# Patient Record
Sex: MALE | Race: WHITE | Employment: FULL TIME | URBAN - METROPOLITAN AREA
[De-identification: names, ages, dates, MRNs, and addresses within clinical notes are randomized per-mention and may not be internally consistent; named-entity substitution may affect disease eponyms.]

---

## 2018-08-16 ENCOUNTER — TRANSFERRED RECORDS (OUTPATIENT)
Dept: HEALTH INFORMATION MANAGEMENT | Facility: CLINIC | Age: 58
End: 2018-08-16

## 2018-10-01 ENCOUNTER — APPOINTMENT (OUTPATIENT)
Dept: GENERAL RADIOLOGY | Facility: CLINIC | Age: 58
End: 2018-10-01
Attending: ORTHOPAEDIC SURGERY
Payer: COMMERCIAL

## 2018-10-01 ENCOUNTER — ANESTHESIA EVENT (OUTPATIENT)
Dept: SURGERY | Facility: CLINIC | Age: 58
End: 2018-10-01
Payer: COMMERCIAL

## 2018-10-01 ENCOUNTER — ANESTHESIA (OUTPATIENT)
Dept: SURGERY | Facility: CLINIC | Age: 58
End: 2018-10-01
Payer: COMMERCIAL

## 2018-10-01 ENCOUNTER — HOSPITAL ENCOUNTER (INPATIENT)
Facility: CLINIC | Age: 58
LOS: 2 days | Discharge: HOME | End: 2018-10-03
Attending: ORTHOPAEDIC SURGERY | Admitting: ORTHOPAEDIC SURGERY
Payer: COMMERCIAL

## 2018-10-01 DIAGNOSIS — M48.062 SPINAL STENOSIS, LUMBAR REGION, WITH NEUROGENIC CLAUDICATION: Primary | ICD-10-CM

## 2018-10-01 LAB
ABO + RH BLD: NORMAL
ABO + RH BLD: NORMAL
BLD GP AB SCN SERPL QL: NORMAL
BLOOD BANK CMNT PATIENT-IMP: NORMAL
POTASSIUM SERPL-SCNC: 4.5 MMOL/L (ref 3.4–5.3)
SPECIMEN EXP DATE BLD: NORMAL

## 2018-10-01 PROCEDURE — 27210995 ZZH RX 272: Performed by: ORTHOPAEDIC SURGERY

## 2018-10-01 PROCEDURE — 25000132 ZZH RX MED GY IP 250 OP 250 PS 637: Performed by: ORTHOPAEDIC SURGERY

## 2018-10-01 PROCEDURE — 36000071 ZZH SURGERY LEVEL 5 W FLUORO 1ST 30 MIN: Performed by: ORTHOPAEDIC SURGERY

## 2018-10-01 PROCEDURE — 25000128 H RX IP 250 OP 636: Performed by: ANESTHESIOLOGY

## 2018-10-01 PROCEDURE — 25000131 ZZH RX MED GY IP 250 OP 636 PS 637: Performed by: ANESTHESIOLOGY

## 2018-10-01 PROCEDURE — 40000985 XR LUMBAR SPINE PORT 1 VW

## 2018-10-01 PROCEDURE — 86900 BLOOD TYPING SEROLOGIC ABO: CPT | Performed by: ORTHOPAEDIC SURGERY

## 2018-10-01 PROCEDURE — 36000069 ZZH SURGERY LEVEL 5 EA 15 ADDTL MIN: Performed by: ORTHOPAEDIC SURGERY

## 2018-10-01 PROCEDURE — 86850 RBC ANTIBODY SCREEN: CPT | Performed by: ORTHOPAEDIC SURGERY

## 2018-10-01 PROCEDURE — 84132 ASSAY OF SERUM POTASSIUM: CPT | Performed by: ANESTHESIOLOGY

## 2018-10-01 PROCEDURE — 27210794 ZZH OR GENERAL SUPPLY STERILE: Performed by: ORTHOPAEDIC SURGERY

## 2018-10-01 PROCEDURE — 40000169 ZZH STATISTIC PRE-PROCEDURE ASSESSMENT I: Performed by: ORTHOPAEDIC SURGERY

## 2018-10-01 PROCEDURE — 27211219 ZZ H OR RESEVOIR 3L 150 MICRON FILTER CELLSAVER HARDSHELL 00205-00: Performed by: ORTHOPAEDIC SURGERY

## 2018-10-01 PROCEDURE — 27211224 ZZ H OR BOWL PROCESSING SET 225ML FOR ELITE MACHINE CSE-P-225: Performed by: ORTHOPAEDIC SURGERY

## 2018-10-01 PROCEDURE — 37000008 ZZH ANESTHESIA TECHNICAL FEE, 1ST 30 MIN: Performed by: ORTHOPAEDIC SURGERY

## 2018-10-01 PROCEDURE — 25000128 H RX IP 250 OP 636: Performed by: ORTHOPAEDIC SURGERY

## 2018-10-01 PROCEDURE — 27211220 ZZ H OR ASSEMBLY BASIC A&A LINE 00208-00: Performed by: ORTHOPAEDIC SURGERY

## 2018-10-01 PROCEDURE — 27211215 ZZ H OR FILTER BLOOD TRANS 40 MICRON SQ40S: Performed by: ORTHOPAEDIC SURGERY

## 2018-10-01 PROCEDURE — 25000132 ZZH RX MED GY IP 250 OP 250 PS 637: Performed by: ANESTHESIOLOGY

## 2018-10-01 PROCEDURE — 25000128 H RX IP 250 OP 636: Performed by: NURSE ANESTHETIST, CERTIFIED REGISTERED

## 2018-10-01 PROCEDURE — 25000125 ZZHC RX 250: Performed by: NURSE ANESTHETIST, CERTIFIED REGISTERED

## 2018-10-01 PROCEDURE — 36415 COLL VENOUS BLD VENIPUNCTURE: CPT | Performed by: ANESTHESIOLOGY

## 2018-10-01 PROCEDURE — 25000125 ZZHC RX 250: Performed by: ORTHOPAEDIC SURGERY

## 2018-10-01 PROCEDURE — 25000566 ZZH SEVOFLURANE, EA 15 MIN: Performed by: ORTHOPAEDIC SURGERY

## 2018-10-01 PROCEDURE — 71000012 ZZH RECOVERY PHASE 1 LEVEL 1 FIRST HR: Performed by: ORTHOPAEDIC SURGERY

## 2018-10-01 PROCEDURE — 37000009 ZZH ANESTHESIA TECHNICAL FEE, EACH ADDTL 15 MIN: Performed by: ORTHOPAEDIC SURGERY

## 2018-10-01 PROCEDURE — 71000013 ZZH RECOVERY PHASE 1 LEVEL 1 EA ADDTL HR: Performed by: ORTHOPAEDIC SURGERY

## 2018-10-01 PROCEDURE — 12000000 ZZH R&B MED SURG/OB

## 2018-10-01 PROCEDURE — 0SG1071 FUSION OF 2 OR MORE LUMBAR VERTEBRAL JOINTS WITH AUTOLOGOUS TISSUE SUBSTITUTE, POSTERIOR APPROACH, POSTERIOR COLUMN, OPEN APPROACH: ICD-10-PCS | Performed by: ORTHOPAEDIC SURGERY

## 2018-10-01 PROCEDURE — 86891 AUTOLOGOUS BLOOD OP SALVAGE: CPT | Performed by: ORTHOPAEDIC SURGERY

## 2018-10-01 PROCEDURE — 86901 BLOOD TYPING SEROLOGIC RH(D): CPT | Performed by: ORTHOPAEDIC SURGERY

## 2018-10-01 RX ORDER — CEFAZOLIN SODIUM 1 G/3ML
1 INJECTION, POWDER, FOR SOLUTION INTRAMUSCULAR; INTRAVENOUS EVERY 8 HOURS
Status: COMPLETED | OUTPATIENT
Start: 2018-10-01 | End: 2018-10-02

## 2018-10-01 RX ORDER — SODIUM CHLORIDE 450 MG/100ML
INJECTION, SOLUTION INTRAVENOUS CONTINUOUS
Status: DISCONTINUED | OUTPATIENT
Start: 2018-10-01 | End: 2018-10-03 | Stop reason: HOSPADM

## 2018-10-01 RX ORDER — ACETAMINOPHEN 325 MG/1
650 TABLET ORAL EVERY 4 HOURS PRN
Status: DISCONTINUED | OUTPATIENT
Start: 2018-10-04 | End: 2018-10-03 | Stop reason: HOSPADM

## 2018-10-01 RX ORDER — CEFAZOLIN SODIUM 2 G/100ML
2 INJECTION, SOLUTION INTRAVENOUS
Status: COMPLETED | OUTPATIENT
Start: 2018-10-01 | End: 2018-10-01

## 2018-10-01 RX ORDER — PROPOFOL 10 MG/ML
INJECTION, EMULSION INTRAVENOUS CONTINUOUS PRN
Status: DISCONTINUED | OUTPATIENT
Start: 2018-10-01 | End: 2018-10-01

## 2018-10-01 RX ORDER — GLYCOPYRROLATE 0.2 MG/ML
INJECTION, SOLUTION INTRAMUSCULAR; INTRAVENOUS PRN
Status: DISCONTINUED | OUTPATIENT
Start: 2018-10-01 | End: 2018-10-01

## 2018-10-01 RX ORDER — ONDANSETRON 4 MG/1
4 TABLET, ORALLY DISINTEGRATING ORAL EVERY 30 MIN PRN
Status: DISCONTINUED | OUTPATIENT
Start: 2018-10-01 | End: 2018-10-01 | Stop reason: HOSPADM

## 2018-10-01 RX ORDER — ESCITALOPRAM OXALATE 10 MG/1
20 TABLET ORAL DAILY
Status: DISCONTINUED | OUTPATIENT
Start: 2018-10-01 | End: 2018-10-03 | Stop reason: HOSPADM

## 2018-10-01 RX ORDER — ACETAMINOPHEN 325 MG/1
975 TABLET ORAL EVERY 8 HOURS
Status: DISCONTINUED | OUTPATIENT
Start: 2018-10-01 | End: 2018-10-03 | Stop reason: HOSPADM

## 2018-10-01 RX ORDER — OXYCODONE HYDROCHLORIDE 5 MG/1
5-10 TABLET ORAL
Status: DISCONTINUED | OUTPATIENT
Start: 2018-10-01 | End: 2018-10-03 | Stop reason: HOSPADM

## 2018-10-01 RX ORDER — ONDANSETRON 2 MG/ML
4 INJECTION INTRAMUSCULAR; INTRAVENOUS EVERY 30 MIN PRN
Status: DISCONTINUED | OUTPATIENT
Start: 2018-10-01 | End: 2018-10-01 | Stop reason: HOSPADM

## 2018-10-01 RX ORDER — ONDANSETRON 2 MG/ML
INJECTION INTRAMUSCULAR; INTRAVENOUS PRN
Status: DISCONTINUED | OUTPATIENT
Start: 2018-10-01 | End: 2018-10-01

## 2018-10-01 RX ORDER — NALOXONE HYDROCHLORIDE 0.4 MG/ML
.1-.4 INJECTION, SOLUTION INTRAMUSCULAR; INTRAVENOUS; SUBCUTANEOUS
Status: DISCONTINUED | OUTPATIENT
Start: 2018-10-01 | End: 2018-10-03 | Stop reason: HOSPADM

## 2018-10-01 RX ORDER — APREPITANT 40 MG/1
40 CAPSULE ORAL ONCE
Status: COMPLETED | OUTPATIENT
Start: 2018-10-01 | End: 2018-10-01

## 2018-10-01 RX ORDER — PROPOFOL 10 MG/ML
INJECTION, EMULSION INTRAVENOUS PRN
Status: DISCONTINUED | OUTPATIENT
Start: 2018-10-01 | End: 2018-10-01

## 2018-10-01 RX ORDER — SODIUM CHLORIDE, SODIUM LACTATE, POTASSIUM CHLORIDE, CALCIUM CHLORIDE 600; 310; 30; 20 MG/100ML; MG/100ML; MG/100ML; MG/100ML
INJECTION, SOLUTION INTRAVENOUS CONTINUOUS
Status: DISCONTINUED | OUTPATIENT
Start: 2018-10-01 | End: 2018-10-01 | Stop reason: HOSPADM

## 2018-10-01 RX ORDER — GABAPENTIN 300 MG/1
300 CAPSULE ORAL ONCE
Status: COMPLETED | OUTPATIENT
Start: 2018-10-01 | End: 2018-10-01

## 2018-10-01 RX ORDER — FENTANYL CITRATE 50 UG/ML
50-100 INJECTION, SOLUTION INTRAMUSCULAR; INTRAVENOUS
Status: COMPLETED | OUTPATIENT
Start: 2018-10-01 | End: 2018-10-01

## 2018-10-01 RX ORDER — DIAZEPAM 5 MG
5 TABLET ORAL EVERY 6 HOURS PRN
Status: DISCONTINUED | OUTPATIENT
Start: 2018-10-01 | End: 2018-10-03 | Stop reason: HOSPADM

## 2018-10-01 RX ORDER — ONDANSETRON 4 MG/1
4 TABLET, ORALLY DISINTEGRATING ORAL EVERY 6 HOURS PRN
Status: DISCONTINUED | OUTPATIENT
Start: 2018-10-01 | End: 2018-10-03 | Stop reason: HOSPADM

## 2018-10-01 RX ORDER — METOCLOPRAMIDE 10 MG/1
10 TABLET ORAL EVERY 6 HOURS PRN
Status: DISCONTINUED | OUTPATIENT
Start: 2018-10-01 | End: 2018-10-03 | Stop reason: HOSPADM

## 2018-10-01 RX ORDER — DEXAMETHASONE SODIUM PHOSPHATE 4 MG/ML
INJECTION, SOLUTION INTRA-ARTICULAR; INTRALESIONAL; INTRAMUSCULAR; INTRAVENOUS; SOFT TISSUE PRN
Status: DISCONTINUED | OUTPATIENT
Start: 2018-10-01 | End: 2018-10-01

## 2018-10-01 RX ORDER — LIDOCAINE 40 MG/G
CREAM TOPICAL
Status: DISCONTINUED | OUTPATIENT
Start: 2018-10-01 | End: 2018-10-03 | Stop reason: HOSPADM

## 2018-10-01 RX ORDER — EPHEDRINE SULFATE 50 MG/ML
INJECTION, SOLUTION INTRAMUSCULAR; INTRAVENOUS; SUBCUTANEOUS PRN
Status: DISCONTINUED | OUTPATIENT
Start: 2018-10-01 | End: 2018-10-01

## 2018-10-01 RX ORDER — OXYCODONE AND ACETAMINOPHEN 7.5; 325 MG/1; MG/1
1 TABLET ORAL DAILY PRN
Status: ON HOLD | COMMUNITY
End: 2018-10-03

## 2018-10-01 RX ORDER — ONDANSETRON 2 MG/ML
4 INJECTION INTRAMUSCULAR; INTRAVENOUS EVERY 6 HOURS PRN
Status: DISCONTINUED | OUTPATIENT
Start: 2018-10-01 | End: 2018-10-03 | Stop reason: HOSPADM

## 2018-10-01 RX ORDER — HYDROMORPHONE HYDROCHLORIDE 1 MG/ML
.3-.5 INJECTION, SOLUTION INTRAMUSCULAR; INTRAVENOUS; SUBCUTANEOUS EVERY 5 MIN PRN
Status: DISCONTINUED | OUTPATIENT
Start: 2018-10-01 | End: 2018-10-01 | Stop reason: HOSPADM

## 2018-10-01 RX ORDER — CEFAZOLIN SODIUM 1 G/3ML
1 INJECTION, POWDER, FOR SOLUTION INTRAMUSCULAR; INTRAVENOUS SEE ADMIN INSTRUCTIONS
Status: DISCONTINUED | OUTPATIENT
Start: 2018-10-01 | End: 2018-10-01 | Stop reason: HOSPADM

## 2018-10-01 RX ORDER — LIDOCAINE HYDROCHLORIDE 20 MG/ML
INJECTION, SOLUTION INFILTRATION; PERINEURAL PRN
Status: DISCONTINUED | OUTPATIENT
Start: 2018-10-01 | End: 2018-10-01

## 2018-10-01 RX ORDER — NALOXONE HYDROCHLORIDE 0.4 MG/ML
.1-.4 INJECTION, SOLUTION INTRAMUSCULAR; INTRAVENOUS; SUBCUTANEOUS
Status: DISCONTINUED | OUTPATIENT
Start: 2018-10-01 | End: 2018-10-01

## 2018-10-01 RX ORDER — FENTANYL CITRATE 50 UG/ML
25-50 INJECTION, SOLUTION INTRAMUSCULAR; INTRAVENOUS
Status: DISCONTINUED | OUTPATIENT
Start: 2018-10-01 | End: 2018-10-01 | Stop reason: HOSPADM

## 2018-10-01 RX ORDER — NEOSTIGMINE METHYLSULFATE 1 MG/ML
VIAL (ML) INJECTION PRN
Status: DISCONTINUED | OUTPATIENT
Start: 2018-10-01 | End: 2018-10-01

## 2018-10-01 RX ORDER — VANCOMYCIN HYDROCHLORIDE 1 G/20ML
INJECTION, POWDER, LYOPHILIZED, FOR SOLUTION INTRAVENOUS PRN
Status: DISCONTINUED | OUTPATIENT
Start: 2018-10-01 | End: 2018-10-01 | Stop reason: HOSPADM

## 2018-10-01 RX ORDER — ACETAMINOPHEN 325 MG/1
975 TABLET ORAL ONCE
Status: COMPLETED | OUTPATIENT
Start: 2018-10-01 | End: 2018-10-01

## 2018-10-01 RX ORDER — SODIUM CHLORIDE 9 MG/ML
INJECTION, SOLUTION INTRAVENOUS CONTINUOUS PRN
Status: DISCONTINUED | OUTPATIENT
Start: 2018-10-01 | End: 2018-10-01

## 2018-10-01 RX ORDER — METOCLOPRAMIDE HYDROCHLORIDE 5 MG/ML
10 INJECTION INTRAMUSCULAR; INTRAVENOUS EVERY 6 HOURS PRN
Status: DISCONTINUED | OUTPATIENT
Start: 2018-10-01 | End: 2018-10-03 | Stop reason: HOSPADM

## 2018-10-01 RX ADMIN — ROCURONIUM BROMIDE 50 MG: 10 INJECTION INTRAVENOUS at 07:34

## 2018-10-01 RX ADMIN — APREPITANT 40 MG: 40 CAPSULE ORAL at 06:43

## 2018-10-01 RX ADMIN — Medication 5 MG: at 09:00

## 2018-10-01 RX ADMIN — CEFAZOLIN 1 G: 1 INJECTION, POWDER, FOR SOLUTION INTRAMUSCULAR; INTRAVENOUS at 09:30

## 2018-10-01 RX ADMIN — LIDOCAINE HYDROCHLORIDE 100 MG: 20 INJECTION, SOLUTION INFILTRATION; PERINEURAL at 07:34

## 2018-10-01 RX ADMIN — HYDROMORPHONE HYDROCHLORIDE 0.5 MG: 1 INJECTION, SOLUTION INTRAMUSCULAR; INTRAVENOUS; SUBCUTANEOUS at 08:02

## 2018-10-01 RX ADMIN — Medication: at 10:43

## 2018-10-01 RX ADMIN — CEFAZOLIN SODIUM 2 G: 2 INJECTION, SOLUTION INTRAVENOUS at 07:29

## 2018-10-01 RX ADMIN — CEFAZOLIN SODIUM 1 G: 1 INJECTION, POWDER, FOR SOLUTION INTRAMUSCULAR; INTRAVENOUS at 16:59

## 2018-10-01 RX ADMIN — ACETAMINOPHEN 975 MG: 500 TABLET ORAL at 06:43

## 2018-10-01 RX ADMIN — Medication 0.5 MG: at 11:44

## 2018-10-01 RX ADMIN — ROCURONIUM BROMIDE 10 MG: 10 INJECTION INTRAVENOUS at 09:16

## 2018-10-01 RX ADMIN — FENTANYL CITRATE 50 MCG: 50 INJECTION, SOLUTION INTRAMUSCULAR; INTRAVENOUS at 07:50

## 2018-10-01 RX ADMIN — ROCURONIUM BROMIDE 10 MG: 10 INJECTION INTRAVENOUS at 08:46

## 2018-10-01 RX ADMIN — DEXAMETHASONE SODIUM PHOSPHATE 8 MG: 4 INJECTION, SOLUTION INTRA-ARTICULAR; INTRALESIONAL; INTRAMUSCULAR; INTRAVENOUS; SOFT TISSUE at 08:10

## 2018-10-01 RX ADMIN — Medication 5 MG: at 08:24

## 2018-10-01 RX ADMIN — ONDANSETRON 4 MG: 2 INJECTION INTRAMUSCULAR; INTRAVENOUS at 10:18

## 2018-10-01 RX ADMIN — DIAZEPAM 5 MG: 5 TABLET ORAL at 22:00

## 2018-10-01 RX ADMIN — PROPOFOL 200 MG: 10 INJECTION, EMULSION INTRAVENOUS at 07:34

## 2018-10-01 RX ADMIN — GLYCOPYRROLATE 0.6 MG: 0.2 INJECTION, SOLUTION INTRAMUSCULAR; INTRAVENOUS at 10:21

## 2018-10-01 RX ADMIN — PHENYLEPHRINE HYDROCHLORIDE 100 MCG: 10 INJECTION, SOLUTION INTRAMUSCULAR; INTRAVENOUS; SUBCUTANEOUS at 08:00

## 2018-10-01 RX ADMIN — ROCURONIUM BROMIDE 10 MG: 10 INJECTION INTRAVENOUS at 08:22

## 2018-10-01 RX ADMIN — Medication 5 MG: at 08:00

## 2018-10-01 RX ADMIN — GLYCOPYRROLATE 0.2 MG: 0.2 INJECTION, SOLUTION INTRAMUSCULAR; INTRAVENOUS at 07:50

## 2018-10-01 RX ADMIN — MIDAZOLAM 2 MG: 1 INJECTION INTRAMUSCULAR; INTRAVENOUS at 07:30

## 2018-10-01 RX ADMIN — ROCURONIUM BROMIDE 10 MG: 10 INJECTION INTRAVENOUS at 09:30

## 2018-10-01 RX ADMIN — DIAZEPAM 5 MG: 5 TABLET ORAL at 14:50

## 2018-10-01 RX ADMIN — Medication 5 MG: at 08:36

## 2018-10-01 RX ADMIN — ROCURONIUM BROMIDE 10 MG: 10 INJECTION INTRAVENOUS at 07:58

## 2018-10-01 RX ADMIN — PROPOFOL 65 MCG/KG/MIN: 10 INJECTION, EMULSION INTRAVENOUS at 07:49

## 2018-10-01 RX ADMIN — SODIUM CHLORIDE, POTASSIUM CHLORIDE, SODIUM LACTATE AND CALCIUM CHLORIDE: 600; 310; 30; 20 INJECTION, SOLUTION INTRAVENOUS at 06:41

## 2018-10-01 RX ADMIN — ACETAMINOPHEN 975 MG: 325 TABLET, FILM COATED ORAL at 21:59

## 2018-10-01 RX ADMIN — Medication 5 MG: at 08:43

## 2018-10-01 RX ADMIN — FENTANYL CITRATE 50 MCG: 50 INJECTION, SOLUTION INTRAMUSCULAR; INTRAVENOUS at 11:13

## 2018-10-01 RX ADMIN — FENTANYL CITRATE 50 MCG: 50 INJECTION, SOLUTION INTRAMUSCULAR; INTRAVENOUS at 11:25

## 2018-10-01 RX ADMIN — SODIUM CHLORIDE: 4.5 INJECTION, SOLUTION INTRAVENOUS at 13:27

## 2018-10-01 RX ADMIN — PHENYLEPHRINE HYDROCHLORIDE 100 MCG: 10 INJECTION, SOLUTION INTRAMUSCULAR; INTRAVENOUS; SUBCUTANEOUS at 09:00

## 2018-10-01 RX ADMIN — SODIUM CHLORIDE, POTASSIUM CHLORIDE, SODIUM LACTATE AND CALCIUM CHLORIDE: 600; 310; 30; 20 INJECTION, SOLUTION INTRAVENOUS at 10:43

## 2018-10-01 RX ADMIN — SODIUM CHLORIDE: 9 INJECTION, SOLUTION INTRAVENOUS at 10:01

## 2018-10-01 RX ADMIN — NEOSTIGMINE METHYLSULFATE 3 MG: 1 INJECTION, SOLUTION INTRAVENOUS at 10:21

## 2018-10-01 RX ADMIN — ROCURONIUM BROMIDE 10 MG: 10 INJECTION INTRAVENOUS at 08:13

## 2018-10-01 RX ADMIN — FENTANYL CITRATE 100 MCG: 50 INJECTION, SOLUTION INTRAMUSCULAR; INTRAVENOUS at 07:34

## 2018-10-01 RX ADMIN — SODIUM CHLORIDE, POTASSIUM CHLORIDE, SODIUM LACTATE AND CALCIUM CHLORIDE: 600; 310; 30; 20 INJECTION, SOLUTION INTRAVENOUS at 08:29

## 2018-10-01 RX ADMIN — Medication 0.5 MG: at 11:05

## 2018-10-01 RX ADMIN — GABAPENTIN 300 MG: 300 CAPSULE ORAL at 06:45

## 2018-10-01 ASSESSMENT — LIFESTYLE VARIABLES: TOBACCO_USE: 1

## 2018-10-01 ASSESSMENT — ENCOUNTER SYMPTOMS: SEIZURES: 0

## 2018-10-01 ASSESSMENT — ACTIVITIES OF DAILY LIVING (ADL)
ADLS_ACUITY_SCORE: 12
ADLS_ACUITY_SCORE: 12

## 2018-10-01 ASSESSMENT — COPD QUESTIONNAIRES: COPD: 0

## 2018-10-01 NOTE — BRIEF OP NOTE
[facility] Brief Operative Note    Pre-operative diagnosis: SPINAL STENOSIS   Post-operative diagnosis * No post-op diagnosis entered *  stenosis   Procedure: Procedure(s):  L1-L5 DECOMPRESSION, L1-5 POSTERIOR LATERAL FUSION INSITU   Surgeon(s): Surgeon(s) and Role:     * Quincy Quiñones MD - Primary   Estimated blood loss: 475 mL    Specimens: * No specimens in log *   Findings: stenosis

## 2018-10-01 NOTE — OR NURSING
Patient Stephen Fontana is in stable condition and has met criteria for PACU discharge.  [facility] was  informed and a sign out was given for Unit/Station transfer.

## 2018-10-01 NOTE — ANESTHESIA POSTPROCEDURE EVALUATION
Patient: Stephen Fontana    Procedure(s):  L1-L5 DECOMPRESSION, L1-5 POSTERIOR LATERAL FUSION INSITU    Diagnosis:SPINAL STENOSIS  Diagnosis Additional Information: No value filed.    Anesthesia Type:  General, ETT    Note:  Anesthesia Post Evaluation    Patient location during evaluation: Bedside  Patient participation: Able to fully participate in evaluation  Level of consciousness: awake and alert  Pain management: adequate  Airway patency: patent  Cardiovascular status: acceptable  Respiratory status: acceptable  Hydration status: acceptable  PONV: none             Last vitals:  Vitals:    10/01/18 1115 10/01/18 1140 10/01/18 1145   BP:  136/74    Pulse:      Resp: 13 12 12   Temp:      SpO2: 97% 94% 96%         Electronically Signed By: Aston Smith MD  October 1, 2018  11:49 AM

## 2018-10-01 NOTE — OR NURSING
Telephone report was given to Station 7000 RN.  Patient will be transported to Room. 40-2 via cart accompanied by NA.  Belongings: backpack and hospital bag.

## 2018-10-01 NOTE — PROGRESS NOTES
Admission medication history interview status for the 10/1/2018  admission is complete. See EPIC admission navigator for prior to admission medications     Medication history source reliability:Good    Medication history interview source(s):Patient    Medication history resources (including written lists, pill bottles, clinic record):None    Primary pharmacy.     Additional medication history information not noted on PTA med list :None    Time spent in this activity: 35 minutes  Prior to Admission medications    Medication Sig Last Dose Taking? Auth Provider   ALPRAZolam (XANAX PO) Take 0.5 mg by mouth nightly as needed for anxiety more than a week at prn Yes Reported, Patient   Escitalopram Oxalate (LEXAPRO PO) Take 20 mg by mouth daily 9/30/2018 at 2300 Yes Reported, Patient   oxyCODONE-acetaminophen (PERCOCET) 7.5-325 MG per tablet Take 1 tablet by mouth daily as needed for severe pain 9/30/2018 at 1800 Yes Reported, Patient

## 2018-10-01 NOTE — ANESTHESIA PREPROCEDURE EVALUATION
Anesthesia Evaluation     . Pt has had prior anesthetic. Type: General    History of anesthetic complications   - PONV        ROS/MED HX    ENT/Pulmonary:     (+)tobacco use, Current use , . .   (-) asthma, COPD and sleep apnea   Neurologic: Comment: Spinal stenosis     (-) seizures, CVA, Neuropathy and migraines   Cardiovascular:        (-) hypertension, CAD and dyslipidemia   METS/Exercise Tolerance:     Hematologic:  - neg hematologic  ROS       Musculoskeletal:         GI/Hepatic:        (-) GERD and liver disease   Renal/Genitourinary:      (-) renal disease   Endo:      (-) Type I DM and Type II DM   Psychiatric:     (+) psychiatric history anxiety (panic attack)      Infectious Disease:         Malignancy:         Other:                     Physical Exam  Normal systems: dental    Airway   Mallampati: I  TM distance: >3 FB  Neck ROM: full    Dental   Comment: The patient denies any loose, chipped, or missing teeth.    Cardiovascular   Rhythm and rate: regular and normal  (-) no murmur    Pulmonary    breath sounds clear to auscultation                    Anesthesia Plan      History & Physical Review  History and physical reviewed and following examination; no interval change.    ASA Status:  2 .    NPO Status:  > 8 hours    Plan for General and ETT with Intravenous induction. Maintenance will be Balanced.    PONV prophylaxis:  Ondansetron (or other 5HT-3)  Emend for nausea  Propofol infusion + gas      Postoperative Care  Postoperative pain management:  Multi-modal analgesia.      Consents  Anesthetic plan, risks, benefits and alternatives discussed with:  Patient..                          .

## 2018-10-01 NOTE — PLAN OF CARE
Problem: Patient Care Overview  Goal: Plan of Care/Patient Progress Review  Outcome: No Change  Patient arrived to unit from PACU ~1220. A&Ox4. VS stable. Patient c/o numbness and pain in RLE, states he had these symptoms prior to surgery. [place] and hemovac in place. Pain managed with Dilaudid PCA.

## 2018-10-01 NOTE — IP AVS SNAPSHOT
MRN:[id number]                      After Visit Summary   10/1/2018    Stephen Fontana    MRN: [id number]           Thank you!     Thank you for choosing [facility] for your care. Our goal is always to provide you with excellent care. Hearing back from our patients is one way we can continue to improve our services. Please take a few minutes to complete the written survey that you may receive in the mail after you visit with us. Thank you!        Patient Information     Date Of Birth          1960        Designated Caregiver       Most Recent Value    Caregiver    Will someone help with your care after discharge? yes    Name of designated caregiver Alyssa     Phone number of caregiver [phone]    Caregiver address [address] [address] [address], [place], [place]      About your hospital stay     You were admitted on:  October 1, 2018 You last received care in the:  [place] [address] [facility]    You were discharged on:  October 3, 2018       Who to Call     For medical emergencies, please call 911.  For non-urgent questions about your medical care, please call your primary care provider or clinic, [phone]  For questions related to your surgery, please call your surgery clinic        Attending Provider     Provider Specialty    Quincy Quiñones MD Orthopedics       Primary Care Provider Office Phone # Fax #    Stephen Vicente [phone]      Further instructions from your care team       TAKE HOME INSTRUCTIONS FOLLOWING A LUMBAR FUSION  Quincy Quiñones M.D.  [facility]  [phone]    The following instructions are general guidelines that apply if you have had an anterior and/or posterior lumbar (back) fusion. If you have further questions after reviewing these instructions, please contact Parisa Chiang R.N. at [phone].    ACTIVITY:    You have undergone major spine surgery. Please be patient. Generally, it can take 6-8 weeks for your normal energy  to return. At the beginning, walking should be your only form of aerobic activity. You should start with short walks and gradually increase the distance as your pain and stamina allow. You may experience some pain when you first start your walking program but realize that this is normal and you are not doing any damage to the surgical area. After 4 weeks you may vary walking with riding a stationary bike. It is very important for your recovery that you start SOME form of aerobic exercise. Aerobic exercise helps strengthen the muscles which support your back. There are many other benefits including weight loss, mood elevation and improved sleep.     During the first 3 months after your surgery we DO NOT want you to begin any back strengthening or stretching exercises. You may have already been through a course of physical therapy before surgery where you were taught back exercises. You may gradually resume these 3 months post-operatively. If you would like a refresher course or did not participate in PT before surgery and would like to at this time, please ask for a prescription from Dr. Quiñones. It is important that you continue your aerobic exercise. Your goal at 3 months should be to work up to a minimum of 30 minutes a day, 3 times a week.    INCISION/DRESSING:    You have dissolvable sutures beneath the skin, which do not need to be removed. The tape-like strips across your incision(s) are called steri-strips. They will eventually fall off on their own and if they haven t done so by the time you come in for your post-operative appointment, we will remove them for you. For your own comfort you may wish to keep a dressing over the incision(s) for a few days. Please inspect your incision once a day and notify our office if there is persistent drainage, swelling and/or redness.    PAIN/PAIN MEDICATIONS:    Pain medication will not take away 100% of your pain. It is meant to keep you  reasonably comfortable. If you  were taking a lot of pain medications before surgery it may not be as effective after surgery. Remember, it is normal for you to experience some pain as you increase your activity.     Your narcotic pain medication will be refilled up to 4-6 weeks after surgery. Over the course of that period you should gradually decrease the amount of pain medications you take each day. As you decrease your pain medication you should continue taking it at night so that you can still get a good night s sleep. After the pain medication is discontinued, Tylenol should be enough to keep you comfortable.    DO NOT take any form of anti-inflammatory medications such as Advil, Motrin, ibuprofen, Aleve, aspirin, or Celebrex for 3 months after your surgery. Studies have shown that taking anti-inflammatory medications after a fusion slows down the healing process.    BOWELS/CONSTIPATION:    Narcotic pain medication can be very constipating. Make sure you drink plenty of fluids and eat plenty of fresh fruits and vegetables to decrease constipation. Walking will also help promote normal bowel function. If the constipation continues, you may want to purchase Metamucil or an over the counter suppository at your pharmacy.     WORK:    Please consult with Dr. Quiñones as to when you may return to work. This will vary depending on the type of work you perform.    GENERAL DO S AND DON TS:      You may shower once you are discharged from the hospital. You do not need to cover your incision, but do remove your dressing prior to showering.      You may soak in a tub or Jacuzzi after your first post-operative appointment if your incision is healed and not draining.      Avoid repetitive bending, twisting, and lifting for 3 months after surgery.       We suggest that you not drive for two or three weeks after surgery. After that time, you may drive only if you can safely move your foot from the gas pedal to the brake pedal and vice-versa AND you have  "stopped your narcotic pain medication.      You may resume sexual activity as comfort allows.      If you develop a fever greater than 100.6(F) which lasts more than 2 days, please contact our office.      If you develop difficulties controlling your bowel and/or bladder, please contact our office.      Please wear your white ANDRÉS stockings until your first follow-up appointment. Take them off 1 hour a day 2 times a day.      GENERAL INFORMATION:      After you are discharged from the hospital, please contact our office to schedule your first follow-up appointment for 10-14 days after surgery. At that first appointment, will take x-rays of your back and answer any questions you may have. You will see us again at 3 months, 6 months and 1 year after surgery.      At the 3 month appointment we will begin to see the bone graft form a fusion. Your fusion will continue to mature up to 2 years after surgery.       Remember, this surgery does not give you a normal back. You may experience periodic flare-ups of  back pain.                  Pending Results     No orders found from 9/29/2018 to 10/2/2018.            Statement of Approval     Ordered          10/03/18 0804  I have reviewed and agree with all the recommendations and orders detailed in this document.  EFFECTIVE NOW     Approved and electronically signed by:  Quincy Quiñones MD             Admission Information     Date & Time Provider Department Dept. Phone    10/1/2018 Quincy Quiñones MD [place] [address] [facility] [phone]      Your Vitals Were     Blood Pressure Pulse Temperature Respirations Height Weight    122/69 (BP Location: Right arm) 57 99.5  F (37.5  C) (Oral) 16 1.702 m (5' 7\") 95.3 kg (210 lb)    Pulse Oximetry BMI (Body Mass Index)                97% 32.89 kg/m2          MyChart Information     [facility] lets you send messages to your doctor, view your test results, renew your prescriptions, schedule appointments and more. To sign " "up, go to [email or web address] . Click on \"Log in\" on the left side of the screen, which will take you to the Welcome page. Then click on \"Sign up Now\" on the right side of the page.     You will be asked to enter the access code listed below, as well as some personal information. Please follow the directions to create your username and password.     Your access code is: [email or web address]  Expires: 2019 12:39 PM     Your access code will  in 90 days. If you need help or a new code, please call your [facility] clinic or [phone].        Care EveryWhere ID     This is your Care EveryWhere ID. This could be used by other organizations to access your [facility] medical records  [id number]        Equal Access to Services     ERAN JARA : Ana Yi, piyush khan, domingo valencia, monroe chirinos . So [facility] [phone].    ATENCIÓN: Si habla español, tiene a weir disposición servicios gratuitos de asistencia lingüística. Cabrera al [phone].    We comply with applicable federal civil rights laws and [place] laws. We do not discriminate on the basis of race, color, national origin, age, disability, sex, sexual orientation, or gender identity.               Review of your medicines      START taking        Dose / Directions    order for DME        Equipment being ordered: Walker Wheels () and Walker () Treatment Diagnosis: Impaired gait   Quantity:  1 each   Refills:  0       oxyCODONE IR 5 MG tablet   Commonly known as:  ROXICODONE        Dose:  5-10 mg   Take 1-2 tablets (5-10 mg) by mouth every 3 hours as needed   Quantity:  40 tablet   Refills:  0         CONTINUE these medicines which have NOT CHANGED        Dose / Directions    LEXAPRO PO        Dose:  20 mg   Take 20 mg by mouth daily   Refills:  0       XANAX PO        Dose:  0.5 mg   Take 0.5 mg by mouth nightly as needed for anxiety   Refills:  0         STOP taking     " oxyCODONE-acetaminophen 7.5-325 MG per tablet   Commonly known as:  PERCOCET                Where to get your medicines      Some of these will need a paper prescription and others can be bought over the counter. Ask your nurse if you have questions.     Bring a paper prescription for each of these medications     order for DME    oxyCODONE IR 5 MG tablet                Protect others around you: Learn how to safely use, store and throw away your medicines at www.disposemymeds.org.        Information about OPIOIDS     PRESCRIPTION OPIOIDS: WHAT YOU NEED TO KNOW   We gave you an opioid (narcotic) pain medicine. It is important to manage your pain, but opioids are not always the best choice. You should first try all the other options your care team gave you. Take this medicine for as short a time (and as few doses) as possible.    Some activities can increase your pain, such as bandage changes or therapy sessions. It may help to take your pain medicine 30 to 60 minutes before these activities. Reduce your stress by getting enough sleep, working on hobbies you enjoy and practicing relaxation or meditation. Talk to your care team about ways to manage your pain beyond prescription opioids.    These medicines have risks:    DO NOT drive when on new or higher doses of pain medicine. These medicines can affect your alertness and reaction times, and you could be arrested for driving under the influence (DUI). If you need to use opioids long-term, talk to your care team about driving.    DO NOT operate heavy machinery    DO NOT do any other dangerous activities while taking these medicines.    DO NOT drink any alcohol while taking these medicines.     If the opioid prescribed includes acetaminophen, DO NOT take with any other medicines that contain acetaminophen. Read all labels carefully. Look for the word  acetaminophen  or  Tylenol.  Ask your pharmacist if you have questions or are unsure.    You can get addicted to pain  medicines, especially if you have a history of addiction (chemical, alcohol or substance dependence). Talk to your care team about ways to reduce this risk.    All opioids tend to cause constipation. Drink plenty of water and eat foods that have a lot of fiber, such as fruits, vegetables, prune juice, apple juice and high-fiber cereal. Take a laxative (Miralax, milk of magnesia, Colace, Senna) if you don t move your bowels at least every other day. Other side effects include upset stomach, sleepiness, dizziness, throwing up, tolerance (needing more of the medicine to have the same effect), physical dependence and slowed breathing.    Store your pills in a secure place, locked if possible. We will not replace any lost or stolen medicine. If you don t finish your medicine, please throw away (dispose) as directed by your pharmacist. The [place] Pollution [employer] has more information about safe disposal: https://www.pca.[facility].[place].us/living-green/managing-unwanted-medications             Medication List: This is a list of all your medications and when to take them. Check marks below indicate your daily home schedule. Keep this list as a reference.      Medications           Morning Afternoon Evening Bedtime As Needed    LEXAPRO PO   Take 20 mg by mouth daily   Last time this was given:  20 mg on 10/3/2018  8:33 AM   Next Dose Due:  Tomorrow morning 10/4/18                                     order for DME   Equipment being ordered: Walker Wheels () and Walker () Treatment Diagnosis: Impaired gait                                oxyCODONE IR 5 MG tablet   Commonly known as:  ROXICODONE   Take 1-2 tablets (5-10 mg) by mouth every 3 hours as needed   Last time this was given:  5 mg on 10/3/2018 11:46 AM   Next Dose Due:  Available again at 2:45PM                                   XANAX PO   Take 0.5 mg by mouth nightly as needed for anxiety   Last time this was given:  Not given during this  hospitalization     Next Dose Due:  As needed; available anytime

## 2018-10-01 NOTE — OP NOTE
Procedure Date: 10/01/2018      PREOPERATIVE DIAGNOSIS:  Spinal stenosis L1 through L5 with recurrent stenosis at L3 to L4, L4 to L5.      POSTOPERATIVE DIAGNOSIS:  Spinal stenosis L1 through L5 with recurrent stenosis at L3 to L4, L4 to L5.      PROCEDURE:     1.  L1, L2, L3, redo L4-5 laminectomy.     2.  Posterolateral fusion in situ with local autograft.      SURGEON:  Quincy Quiñones MD      ASSISTANT:  JULIO CÉSAR Parisi      DESCRIPTION OF SURGERY:  The patient was placed under general anesthesia.  After assurance of adequate anesthetic levels, turned prone on the Brandon table.  He did receive 2  grams of cefazolin and this was circulating at the time of incision.  This will be dosed per protocol for 24 hours.  His previous incision was opened and carried up proximally.  A subperiosteal dissection was carried out over the lamina of L1, L2, L3, L4 and L5.  It was carried laterally to expose the transverse processes of L1 through L5.  A lateral radiograph was taken with a Kocher clamp directed at the L2 pedicle, confirming anatomic placement.  Did have moderate scar at the L3 to L4 and L4 to L5 level.        Attention was directed to decompression.  The spinous processes of L1 through L5 were removed.  These were retained, morcellized, and used in the arthrodesis.  The laminae were thinned with a Midas Ortega with this bone being retained as well.  A laminectomy was now performed beginning at L5, extending up through L4, L3, L2 and the caudal two-thirds of the lamina of L1.  He was found to have marked stenosis throughout his lumbar spine, both centrally and within the lateral recess.  It was carried to the inferior medial border of the L1 pedicles, the medial border of the L2, L3, L4 and L5 pedicles.  Further decompression was accomplished with Kerrison rongeurs out through the respective foramen.  Care was taken to avoid excessive resection of the facets, pars intraarticularis.  The dorsal facet capsules were left  intact.        Following decompression, the wound was irrigated copiously.  The transverse processes of L1 through L5 were decorticated and the morcellized lamina spinous process placed in the posterolateral gutter.  1 gram of powder vancomycin was placed deep and then a deep Hemovac drain placed.  The fascia was closed with #0 Vicryl, the subcutaneous tissue closed with 2-0 Vicryl, and the skin with a running subcuticular 3-0 undyed Vicryl.  Steri-Strips followed by sterile compressive dressing was applied.  He was turned back to the supine position, awakened, extubated, and taken to the recovery room in stable condition.        The blood loss was approximately 400 mL for which he received 200 back from the Cell Saver.  The specimen was the ligamentum flavum.  There were no intraoperative complications.         CHARLENE TONG MD             D: 10/01/2018   T: 10/01/2018   MT: RUTH      Name:     LUIS ALBERTO DAHL   MRN:      [id number]        Account:        [id number]   :      1960           Procedure Date: 10/01/2018      Document: [id number]       cc: Luis Alberto Vicente MD    Home

## 2018-10-01 NOTE — IP AVS SNAPSHOT
[address] [facility]    [address]    [place] [address]    Phone:  [phone]                                       After Visit Summary   10/1/2018    Stephen Fontana    MRN: [id number]           After Visit Summary Signature Page     I have received my discharge instructions, and my questions have been answered. I have discussed any challenges I see with this plan with the nurse or doctor.    ..........................................................................................................................................  Patient/Patient Representative Signature      ..........................................................................................................................................  Patient Representative Print Name and Relationship to Patient    ..................................................               ................................................  Date                                   Time    ..........................................................................................................................................  Reviewed by Signature/Title    ...................................................              ..............................................  Date                                               Time          22EPIC Rev 08/18

## 2018-10-01 NOTE — ANESTHESIA CARE TRANSFER NOTE
Patient: Stephen Fontana    Procedure(s):  L1-L5 DECOMPRESSION, L1-5 POSTERIOR LATERAL FUSION INSITU    Diagnosis: SPINAL STENOSIS  Diagnosis Additional Information: No value filed.    Anesthesia Type:   General, ETT     Note:  Airway :Face Mask  Patient transferred to:PACU  Comments: To PACU, oxygen per face mask, report to RN.      Vitals: (Last set prior to Anesthesia Care Transfer)    CRNA VITALS  10/1/2018 1006 - 10/1/2018 1046      10/1/2018             NIBP: 127/64    NIBP Mean: 91                Electronically Signed By: PILO Ball CRNA  October 1, 2018  10:46 AM

## 2018-10-02 ENCOUNTER — APPOINTMENT (OUTPATIENT)
Dept: PHYSICAL THERAPY | Facility: CLINIC | Age: 58
End: 2018-10-02
Attending: ORTHOPAEDIC SURGERY
Payer: COMMERCIAL

## 2018-10-02 LAB
GLUCOSE BLDC GLUCOMTR-MCNC: 100 MG/DL (ref 70–99)
HGB BLD-MCNC: 12.6 G/DL (ref 13.3–17.7)

## 2018-10-02 PROCEDURE — 00000146 ZZHCL STATISTIC GLUCOSE BY METER IP

## 2018-10-02 PROCEDURE — 12000007 ZZH R&B INTERMEDIATE

## 2018-10-02 PROCEDURE — 97161 PT EVAL LOW COMPLEX 20 MIN: CPT | Mod: GP | Performed by: PHYSICAL THERAPIST

## 2018-10-02 PROCEDURE — 40000193 ZZH STATISTIC PT WARD VISIT: Performed by: PHYSICAL THERAPIST

## 2018-10-02 PROCEDURE — 97116 GAIT TRAINING THERAPY: CPT | Mod: GP

## 2018-10-02 PROCEDURE — 40000193 ZZH STATISTIC PT WARD VISIT

## 2018-10-02 PROCEDURE — 97530 THERAPEUTIC ACTIVITIES: CPT | Mod: GP | Performed by: PHYSICAL THERAPIST

## 2018-10-02 PROCEDURE — 25000128 H RX IP 250 OP 636: Performed by: ORTHOPAEDIC SURGERY

## 2018-10-02 PROCEDURE — 85018 HEMOGLOBIN: CPT | Performed by: ORTHOPAEDIC SURGERY

## 2018-10-02 PROCEDURE — 25000132 ZZH RX MED GY IP 250 OP 250 PS 637: Performed by: ORTHOPAEDIC SURGERY

## 2018-10-02 PROCEDURE — 36415 COLL VENOUS BLD VENIPUNCTURE: CPT | Performed by: ORTHOPAEDIC SURGERY

## 2018-10-02 PROCEDURE — 27210995 ZZH RX 272: Performed by: ORTHOPAEDIC SURGERY

## 2018-10-02 PROCEDURE — 25000125 ZZHC RX 250: Performed by: ORTHOPAEDIC SURGERY

## 2018-10-02 RX ADMIN — OXYCODONE HYDROCHLORIDE 5 MG: 5 TABLET ORAL at 13:52

## 2018-10-02 RX ADMIN — CEFAZOLIN SODIUM 1 G: 1 INJECTION, POWDER, FOR SOLUTION INTRAMUSCULAR; INTRAVENOUS at 00:04

## 2018-10-02 RX ADMIN — SODIUM CHLORIDE: 4.5 INJECTION, SOLUTION INTRAVENOUS at 00:04

## 2018-10-02 RX ADMIN — ESCITALOPRAM 20 MG: 10 TABLET, FILM COATED ORAL at 08:06

## 2018-10-02 RX ADMIN — OXYCODONE HYDROCHLORIDE 5 MG: 5 TABLET ORAL at 17:16

## 2018-10-02 RX ADMIN — SODIUM CHLORIDE: 4.5 INJECTION, SOLUTION INTRAVENOUS at 10:33

## 2018-10-02 RX ADMIN — LIDOCAINE HYDROCHLORIDE 10 ML: 20 JELLY TOPICAL at 13:19

## 2018-10-02 RX ADMIN — ACETAMINOPHEN 975 MG: 325 TABLET, FILM COATED ORAL at 13:52

## 2018-10-02 RX ADMIN — ACETAMINOPHEN 975 MG: 325 TABLET, FILM COATED ORAL at 22:11

## 2018-10-02 RX ADMIN — DIAZEPAM 5 MG: 5 TABLET ORAL at 03:39

## 2018-10-02 RX ADMIN — OXYCODONE HYDROCHLORIDE 5 MG: 5 TABLET ORAL at 20:10

## 2018-10-02 RX ADMIN — ACETAMINOPHEN 975 MG: 325 TABLET, FILM COATED ORAL at 05:43

## 2018-10-02 ASSESSMENT — ACTIVITIES OF DAILY LIVING (ADL)
ADLS_ACUITY_SCORE: 10
ADLS_ACUITY_SCORE: 12
ADLS_ACUITY_SCORE: 10

## 2018-10-02 NOTE — PROGRESS NOTES
[facility]  Spine Surgery Daily Note          Assessment and Plan:   Spine Surgery             Interval History:   pain under control              Medications:     Current Facility-Administered Medications   Medication     0.45% sodium chloride infusion     [START ON 10/4/2018] acetaminophen (TYLENOL) tablet 650 mg     acetaminophen (TYLENOL) tablet 975 mg     diazepam (VALIUM) tablet 5 mg     escitalopram (LEXAPRO) tablet 20 mg     HYDROmorphone (DILAUDID) PCA 1 mg/mL OPIOID NAIVE     lidocaine (LMX4) cream     lidocaine 1 % 1 mL     metoclopramide (REGLAN) tablet 10 mg    Or     metoclopramide (REGLAN) injection 10 mg     naloxone (NARCAN) injection 0.1-0.4 mg     ondansetron (ZOFRAN-ODT) ODT tab 4 mg    Or     ondansetron (ZOFRAN) injection 4 mg     oxyCODONE IR (ROXICODONE) tablet 5-10 mg     sodium chloride (PF) 0.9% PF flush 3 mL     sodium chloride (PF) 0.9% PF flush 3 mL               Physical Exam:   Vitals were reviewed    Intake/Output Summary (Last 24 hours) at 10/02/18 1132  Last data filed at 10/02/18 0600   Gross per 24 hour   Intake             1644 ml   Output             2125 ml   Net             -481 ml     Back:   Hgb=12.6,Vac=100     Neurologic:   Motor Exam:  moves all extremities well and symmetrically  Sensory:  Sensory intact     Skin:   Clean and dry          Data:   All laboratory data reviewed  All imaging studies reviewed by me.  Starting PT    Quincy Quiñones MD

## 2018-10-02 NOTE — PROGRESS NOTES
10/02/18 0955   Quick Adds   Type of Visit Initial PT Evaluation   Living Environment   Lives With alone   Living Arrangements house   Home Accessibility stairs to enter home;stairs within home;tub/shower is not walk in   Number of Stairs to Enter Home 2  (No rail. )   Number of Stairs Within Home 24  (To basement and upstairs but doesn't have to use. One rail)   Transportation Available car;family or friend will provide  (Family member can provide while on pain meds. )   Self-Care   Activity/Exercise/Self-Care Comment Works at a [employer]. No longer exercises due to back pain.    Functional Level Prior   Ambulation 0-->independent   Transferring 0-->independent   Toileting 0-->independent   Bathing 0-->independent   Dressing 0-->independent   Eating 0-->independent   Communication 0-->understands/communicates without difficulty   Swallowing 0-->swallows foods/liquids without difficulty   Cognition 0 - no cognition issues reported   Fall history within last six months no   Which of the above functional risks had a recent onset or change? none   General Information   Onset of Illness/Injury or Date of Surgery - Date 10/01/18   Referring Physician Quincy Quiñones.    Patient/Family Goals Statement Plans on going home and will have someone with him the first 24 hours.    Pertinent History of Current Problem (include personal factors and/or comorbidities that impact the POC) Patient had a L1-L5 DECOMPRESSION, L1-5 POSTERIOR LATERAL FUSION    Precautions/Limitations fall precautions;spinal precautions   Weight-Bearing Status - LUE full weight-bearing   Weight-Bearing Status - RUE full weight-bearing   Weight-Bearing Status - LLE full weight-bearing   Weight-Bearing Status - RLE full weight-bearing   General Observations Generally not feeling well with sitting.    Cognitive Status Examination   Orientation orientation to person, place and time   Level of Consciousness alert   Follows Commands and Answers Questions 100% of  the time   Personal Safety and Judgment intact   Memory intact   Pain Assessment   Patient Currently in Pain Yes, see Vital Sign flowsheet   Integumentary/Edema   Integumentary/Edema Comments Drain intact.    Posture    Posture Not impaired   Range of Motion (ROM)   ROM Comment Trunk limited by pain and precautions.    Strength   Strength Comments LE strength limited due to pain.    Bed Mobility   Bed Mobility Comments Sup to sidelying with cues for technique. Sidelying to sit with cues for technique and min assisst. Sit to sidelying with min assist and cues for technique.    Transfer Skills   Transfer Comments Sit to stand with min assist. Took a lot of extra time and needed to raise the bed twice.    Gait   Gait Comments Able to take 3 steps sideways to the head of the bed with support of ww.    Balance   Balance Comments Good sitting balance. Good standing balance with support of ww.    Sensory Examination   Sensory Perception no deficits were identified   Coordination   Coordination no deficits were identified   Muscle Tone   Muscle Tone no deficits were identified   General Therapy Interventions   Planned Therapy Interventions bed mobility training;gait training;transfer training   Clinical Impression   Criteria for Skilled Therapeutic Intervention yes, treatment indicated   PT Diagnosis Impaired functinal independence   Influenced by the following impairments Pain, nausea, light headed.    Functional limitations due to impairments Patient needs assist for bed mobility, and transfers and currently unable to amb.    Clinical Presentation Stable/Uncomplicated   Clinical Decision Making (Complexity) Low complexity   Therapy Frequency` 2 times/day   Predicted Duration of Therapy Intervention (days/wks) 4 days   Anticipated Equipment Needs at Discharge (May need a ww. Will continue to assess. )   Anticipated Discharge Disposition Home  (Assist for IADL's. )   Risk & Benefits of therapy have been explained Yes  "  Patient, Family & other staff in agreement with plan of care Yes   [facility] AM-PAC TM \"6 Clicks\"   2016, Trustees of [facility], under license to [employer].  All rights reserved.   6 Clicks Short Forms Basic Mobility Inpatient Short Form   [facility] AM-PAC  \"6 Clicks\" V.2 Basic Mobility Inpatient Short Form   1. Turning from your back to your side while in a flat bed without using bedrails? 3 - A Little   2. Moving from lying on your back to sitting on the side of a flat bed without using bedrails? 2 - A Lot   3. Moving to and from a bed to a chair (including a wheelchair)? 2 - A Lot   4. Standing up from a chair using your arms (e.g., wheelchair, or bedside chair)? 2 - A Lot   5. To walk in hospital room? 2 - A Lot   6. Climbing 3-5 steps with a railing? 2 - A Lot   Basic Mobility Raw Score (Score out of 24.Lower scores equate to lower levels of function) 13   Total Evaluation Time   Total Evaluation Time (Minutes) 12     "

## 2018-10-02 NOTE — PLAN OF CARE
Problem: Patient Care Overview  Goal: Plan of Care/Patient Progress Review  Outcome: No Change  A&Ox4. CMS intact. Bowel sounds active, -gas. VSS on RA. IVF infusing at 100/hr. Dressing CDI. Hemovac patent, output of 100ml. Singleton pulled at 0600. Due to void. Regular diet, wasn't ready to eat on eves, will try in AM. Up with A1-2/GB, dangled this morning. Denies nausea, CP, SOB. C/o 5/10 pain, decreased with PCA, Valium x1. Plan to continue to monitor.

## 2018-10-02 NOTE — PLAN OF CARE
Problem: Patient Care Overview  Goal: Plan of Care/Patient Progress Review  Outcome: Improving  A&Ox4. CMS intact. Bowel sounds active, +flatus. VSS. Dressing CDI. Hemovac patent, output 150mL. Up with Ax1 GB & walker. C/o minimal to moderate incisional pain, decreased with PCA (now discontinued), started 5mg oxyodone, ice, repositioning, & scheduled tylenol. Pt reports history of post op nausea/pain meds harsh on his stomach--encouraged eating food/or crackers when taking narcotics to help minimize nausea. Voided x1, but . Straight cathed for 500mL. Singleton left in for 1 hour per patient request. IS encouraged. Tolerating regular diet. Plan continue to work with therapies & monitor PVRs.    ADDENDUM 1141-9284: Pt DTV. No nausea. No CMS changes. Pain well controlled with 5mg oxycodone.

## 2018-10-02 NOTE — PLAN OF CARE
"Problem: Patient Care Overview  Goal: Plan of Care/Patient Progress Review  Outcome: No Change  A&O x4.C/o numbness to RLE during first assessments, no denies any N&T. Bowel sounds hypoactive, -flatus. VSS on RA. Dressing CDI. Hemovac patent. Singleton patent. Declines t&R at times, educated on importance. C/o pain to back and R leg, decreased with PCA, tylenol and dilaudid. Therapies to consult in AM. Nursing to monitor.     Addendum: Patient tolerating water and ice chips, declines any clear liquids stating , \" I will eat tomorrow\". Encouraged adequate PO intake, denies any nasuea. Encourage dangle at beside, refused to dangle. Discussed PT consult and importance of frequent repositioning. Updated oncoming RN  "

## 2018-10-02 NOTE — PLAN OF CARE
Problem: Patient Care Overview  Goal: Plan of Care/Patient Progress Review  PT:  Discharge Planner PT   Patient plan for discharge: Not stated  Current status: Patient seen for PM PT session, agreeable to working with therapy. Supine<>sit completed with proper log rolling technique and CGA. Patient noting minor dizziness upon sitting but does not note any increase with dizziness throughout session. Patient able to complete sit<>stand from EOB with height of bed slightly raised and hands on FWW to initiate transfer due to patient having difficulty completing transfer with push-off from bed. Patient ambulated 40 feet with use of FWW and Min A progressing to CGA, slow but steady gait pattern noted. Patient returned to supine at end of session with all needs in reach and bed alarm on.   Barriers to return to prior living situation: current level of assistance, decreased tolerance to activity   Recommendations for discharge: Home with assist   Rationale for recommendations: Anticipate patient will continue to progress to level of independence for safe discharge home with A for household tasks.        Entered by: Brittany Whittington 10/02/2018 3:56 PM

## 2018-10-03 ENCOUNTER — APPOINTMENT (OUTPATIENT)
Dept: PHYSICAL THERAPY | Facility: CLINIC | Age: 58
End: 2018-10-03
Attending: ORTHOPAEDIC SURGERY
Payer: COMMERCIAL

## 2018-10-03 VITALS
WEIGHT: 210 LBS | HEIGHT: 67 IN | HEART RATE: 57 BPM | BODY MASS INDEX: 32.96 KG/M2 | OXYGEN SATURATION: 97 % | SYSTOLIC BLOOD PRESSURE: 122 MMHG | DIASTOLIC BLOOD PRESSURE: 69 MMHG | TEMPERATURE: 99.5 F | RESPIRATION RATE: 16 BRPM

## 2018-10-03 PROCEDURE — 97116 GAIT TRAINING THERAPY: CPT | Mod: GP | Performed by: PHYSICAL THERAPIST

## 2018-10-03 PROCEDURE — 40000193 ZZH STATISTIC PT WARD VISIT: Performed by: PHYSICAL THERAPIST

## 2018-10-03 PROCEDURE — 25000132 ZZH RX MED GY IP 250 OP 250 PS 637: Performed by: ORTHOPAEDIC SURGERY

## 2018-10-03 PROCEDURE — 97530 THERAPEUTIC ACTIVITIES: CPT | Mod: GP | Performed by: PHYSICAL THERAPIST

## 2018-10-03 RX ORDER — OXYCODONE HYDROCHLORIDE 5 MG/1
5-10 TABLET ORAL
Qty: 40 TABLET | Refills: 0 | Status: SHIPPED | OUTPATIENT
Start: 2018-10-03

## 2018-10-03 RX ADMIN — ESCITALOPRAM 20 MG: 10 TABLET, FILM COATED ORAL at 08:33

## 2018-10-03 RX ADMIN — ACETAMINOPHEN 975 MG: 325 TABLET, FILM COATED ORAL at 06:49

## 2018-10-03 RX ADMIN — OXYCODONE HYDROCHLORIDE 5 MG: 5 TABLET ORAL at 04:25

## 2018-10-03 RX ADMIN — OXYCODONE HYDROCHLORIDE 5 MG: 5 TABLET ORAL at 08:33

## 2018-10-03 RX ADMIN — OXYCODONE HYDROCHLORIDE 5 MG: 5 TABLET ORAL at 11:46

## 2018-10-03 ASSESSMENT — ACTIVITIES OF DAILY LIVING (ADL)
ADLS_ACUITY_SCORE: 10

## 2018-10-03 NOTE — PLAN OF CARE
Problem: Patient Care Overview  Goal: Plan of Care/Patient Progress Review  Outcome: Therapy, progress toward functional goals is gradual  A&Ox4. VSS on RA. Up with A1 GB/W. Regular diet, but has hx of postop nausea and has been eating saltines to help with this. 2 PVR's done this shift, no need to straight cath, continue to encourage frequent voiding. Dressing CDI. Hemovac patent. CMS intact. IV SL. C/o pain 6/10, managed with scheduled Tylenol and oxycodone x1. Continue to monitor voiding ability.

## 2018-10-03 NOTE — PLAN OF CARE
Problem: Patient Care Overview  Goal: Plan of Care/Patient Progress Review  Outcome: No Change  A&Ox4. CMS intact. Bowel sounds active and audiable. Lung sounds clear. VSS. Dressing CDI. Hemovac patent 50 mL output. Up with A1 GBW. C/o 6/10 pain, decreased with PRN oxycodone. Plan for possible discharge today.

## 2018-10-03 NOTE — DISCHARGE INSTRUCTIONS
TAKE HOME INSTRUCTIONS FOLLOWING A LUMBAR FUSION  Quincy Quiñones M.D.  [facility]  [phone]    The following instructions are general guidelines that apply if you have had an anterior and/or posterior lumbar (back) fusion. If you have further questions after reviewing these instructions, please contact Parisa hCiang R.N. at [phone].    ACTIVITY:    You have undergone major spine surgery. Please be patient. Generally, it can take 6-8 weeks for your normal energy to return. At the beginning, walking should be your only form of aerobic activity. You should start with short walks and gradually increase the distance as your pain and stamina allow. You may experience some pain when you first start your walking program but realize that this is normal and you are not doing any damage to the surgical area. After 4 weeks you may vary walking with riding a stationary bike. It is very important for your recovery that you start SOME form of aerobic exercise. Aerobic exercise helps strengthen the muscles which support your back. There are many other benefits including weight loss, mood elevation and improved sleep.     During the first 3 months after your surgery we DO NOT want you to begin any back strengthening or stretching exercises. You may have already been through a course of physical therapy before surgery where you were taught back exercises. You may gradually resume these 3 months post-operatively. If you would like a refresher course or did not participate in PT before surgery and would like to at this time, please ask for a prescription from Dr. Quiñones. It is important that you continue your aerobic exercise. Your goal at 3 months should be to work up to a minimum of 30 minutes a day, 3 times a week.    INCISION/DRESSING:    You have dissolvable sutures beneath the skin, which do not need to be removed. The tape-like strips across your incision(s) are called steri-strips. They will  eventually fall off on their own and if they haven t done so by the time you come in for your post-operative appointment, we will remove them for you. For your own comfort you may wish to keep a dressing over the incision(s) for a few days. Please inspect your incision once a day and notify our office if there is persistent drainage, swelling and/or redness.    PAIN/PAIN MEDICATIONS:    Pain medication will not take away 100% of your pain. It is meant to keep you  reasonably comfortable. If you were taking a lot of pain medications before surgery it may not be as effective after surgery. Remember, it is normal for you to experience some pain as you increase your activity.     Your narcotic pain medication will be refilled up to 4-6 weeks after surgery. Over the course of that period you should gradually decrease the amount of pain medications you take each day. As you decrease your pain medication you should continue taking it at night so that you can still get a good night s sleep. After the pain medication is discontinued, Tylenol should be enough to keep you comfortable.    DO NOT take any form of anti-inflammatory medications such as Advil, Motrin, ibuprofen, Aleve, aspirin, or Celebrex for 3 months after your surgery. Studies have shown that taking anti-inflammatory medications after a fusion slows down the healing process.    BOWELS/CONSTIPATION:    Narcotic pain medication can be very constipating. Make sure you drink plenty of fluids and eat plenty of fresh fruits and vegetables to decrease constipation. Walking will also help promote normal bowel function. If the constipation continues, you may want to purchase Metamucil or an over the counter suppository at your pharmacy.     WORK:    Please consult with Dr. Quiñones as to when you may return to work. This will vary depending on the type of work you perform.    GENERAL DO S AND DON TS:      You may shower once you are discharged from the hospital. You do  not need to cover your incision, but do remove your dressing prior to showering.      You may soak in a tub or Jacuzzi after your first post-operative appointment if your incision is healed and not draining.      Avoid repetitive bending, twisting, and lifting for 3 months after surgery.       We suggest that you not drive for two or three weeks after surgery. After that time, you may drive only if you can safely move your foot from the gas pedal to the brake pedal and vice-versa AND you have stopped your narcotic pain medication.      You may resume sexual activity as comfort allows.      If you develop a fever greater than 100.6(F) which lasts more than 2 days, please contact our office.      If you develop difficulties controlling your bowel and/or bladder, please contact our office.      Please wear your white ANDRÉS stockings until your first follow-up appointment. Take them off 1 hour a day 2 times a day.      GENERAL INFORMATION:      After you are discharged from the hospital, please contact our office to schedule your first follow-up appointment for 10-14 days after surgery. At that first appointment, will take x-rays of your back and answer any questions you may have. You will see us again at 3 months, 6 months and 1 year after surgery.      At the 3 month appointment we will begin to see the bone graft form a fusion. Your fusion will continue to mature up to 2 years after surgery.       Remember, this surgery does not give you a normal back. You may experience periodic flare-ups of  back pain.

## 2018-10-03 NOTE — PLAN OF CARE
Problem: Patient Care Overview  Goal: Plan of Care/Patient Progress Review  Outcome: Adequate for Discharge Date Met: 10/03/18  A&Ox4. CMS intact. Bowel sounds active, +flatus. VSS. Dressing changed, scant drainage at side. Hemovac pulled. Up with walker. C/o minimal to moderate incisional pain, decreased with 5mg oxycodone. Discharged home via caregiver, Alyssa. Discharged instructions & medications reviewed with pt & caregiver, questions answered. Teaching on dressing change provided. Sent with belongings.

## 2018-10-03 NOTE — PLAN OF CARE
Problem: Patient Care Overview  Goal: Plan of Care/Patient Progress Review  Discharge Planner PT   Patient plan for discharge: Home  Current status: Pt transfers supine<>sit with SBA just for min VC for technique. Pt transfers sit<>stand to FWW with SBA. Pt ambulates 300' with FWW and SBA, safely navigates over threshold/carpeting. Pt able to ascend/descend 3 stairs x2 with HHA of 1. Pain 3/10 at rest, 4/10 with activity. Pt able to recall 3/3 spine precautions. Reviewed home walking program.  Barriers to return to prior living situation: Decreased activity tolerance   Recommendations for discharge: Home with use of FWW for mobility, assist for household tasks  Rationale for recommendations: Pt progressing well with mobility, anticipate safe disch to home with use of FWW for mobility and assist for household tasks. DME entered.        Entered by: Viviane Valdivia 10/03/2018 9:25 AM         Physical Therapy Discharge Summary    Reason for therapy discharge:    Discharged to home.    Progress towards therapy goal(s). See goals on Care Plan in [facility] electronic health record for goal details.  Goals partially met.  Barriers to achieving goals:   discharge from facility.    Therapy recommendation(s):    Continue home walking program

## 2018-10-03 NOTE — OP NOTE
Procedure Date: 10/03/2018      DATE OF ADMISSION:  10/01/2018.      DATE OF DISCHARGE:  10/03/2018.      DISCHARGE DIAGNOSIS:  Spinal stenosis L1 through L5.      PROCEDURE:  L1 through 5 decompression including redo at L3 and L4, posterolateral fusion in situ with local autograft.      HISTORY OF PRESENT ILLNESS AND HOSPITAL COURSE:  Stephen Fontana is a 58-year-old gentleman who has had progressive neurogenic claudication.  Studies have demonstrated marked stenosis.  Because of failure to respond to nonoperative management, it was elected he undergo the aforementioned procedure.  Intraoperatively, he proceeded well.  Postoperatively, he was gradually mobilized.  He was up independently to the time of discharge with his wound healing nicely and neurologically intact.      DISCHARGE DISPOSITION:  Discharged to home where he will be encouraged to continue with his walking program.  He should return for followup in approximately 2 weeks' time.  He will be discharged with oxycodone 5 mg and received 40 of those.         CHARLENE TONG MD             D: 10/03/2018   T: 10/03/2018   MT: SHERIF      Name:     STEPHEN FONTANA   MRN:      [id number]        Account:        [id number]   :      1960           Procedure Date: 10/03/2018      Document: [id number]       cc: Stephen Vicente MD

## 2018-10-10 NOTE — DISCHARGE SUMMARY
Admit Date:     10/01/2018   Discharge Date:     10/03/2018      Revised      DATE OF ADMISSION:  10/01/2018.         DATE OF DISCHARGE:  10/03/2018.         DISCHARGE DIAGNOSIS:  Spinal stenosis L1 through L5.         PROCEDURE:  L1 through 5 decompression including redo at L3 and L4, posterolateral fusion in situ with local autograft.         HISTORY OF PRESENT ILLNESS AND HOSPITAL COURSE:  Stephen Fontana is a 58-year-old gentleman who has had progressive neurogenic claudication.  Studies have demonstrated marked stenosis.  Because of failure to respond to nonoperative management, it was elected he undergo the aforementioned procedure.  Intraoperatively, he proceeded well.  Postoperatively, he was gradually mobilized.  He was up independently to the time of discharge with his wound healing nicely and neurologically intact.         DISCHARGE DISPOSITION:  Discharged to home where he will be encouraged to continue with his walking program.  He should return for followup in approximately 2 weeks' time.  He will be discharged with oxycodone 5 mg and received 40 of those.      Revised work type lg 10/10/18            CHARLENE TONG MD             D: 10/03/2018   T: 10/03/2018   MT: SHERIF      Name:     STEPHEN FONTANA   MRN:      [id number]        Account:        [id number]   :      1960           Admit Date:     10/01/2018                                  Discharge Date: 10/03/2018      Document: [id number]       cc: Stephen Vicente MD

## (undated) DEVICE — SU VICRYL 0 CT-1 CR 8X18" J740D

## (undated) DEVICE — CUSHION INSERT LG PRONE VIEW JACKSON TABLE

## (undated) DEVICE — LINEN TOWEL PACK X5 5464

## (undated) DEVICE — GLOVE PROTEXIS BLUE W/NEU-THERA 7.0  2D73EB70

## (undated) DEVICE — PAD POSITIONING KIT CHEST SHEARGUARD DISP LF 5844-13

## (undated) DEVICE — SU VICRYL 3-0 PS-1 18" UND J683

## (undated) DEVICE — BONE PRESS HENSLER HBP-010

## (undated) DEVICE — DRAIN ROUND W/RESERV KIT JACKSON PRATT 10FR 400ML SU130-402D

## (undated) DEVICE — MIDAS REX DISSECTING TOOL  14MH30

## (undated) DEVICE — DRSG TELFA 3X8" 1238

## (undated) DEVICE — PAD POSITIONING KIT HIP DISP 5844-17

## (undated) DEVICE — GLOVE PROTEXIS W/NEU-THERA 7.0  2D73TE70

## (undated) DEVICE — SPONGE SURGIFOAM 100 1974

## (undated) DEVICE — SU VICRYL 2-0 CT-1 27" J339H

## (undated) DEVICE — SOL WATER IRRIG 1000ML BOTTLE 2F7114

## (undated) DEVICE — BLADE BONE MILL STRK 3.2MM FINE 5400-702-000

## (undated) DEVICE — GLOVE PROTEXIS MICRO 7.5  2D73PM75

## (undated) DEVICE — PACK LARGE SPINE SNE15LSFSE

## (undated) DEVICE — DRAPE SHEET REV FOLD 3/4 9349

## (undated) DEVICE — GLOVE PROTEXIS BLUE W/NEU-THERA 7.5  2D73EB75

## (undated) DEVICE — MANIFOLD NEPTUNE 4 PORT 700-20

## (undated) DEVICE — ESU GROUND PAD UNIVERSAL W/O CORD

## (undated) RX ORDER — FENTANYL CITRATE 50 UG/ML
INJECTION, SOLUTION INTRAMUSCULAR; INTRAVENOUS
Status: DISPENSED
Start: 2018-10-01

## (undated) RX ORDER — ACETAMINOPHEN 500 MG
TABLET ORAL
Status: DISPENSED
Start: 2018-10-01

## (undated) RX ORDER — GLYCOPYRROLATE 0.2 MG/ML
INJECTION, SOLUTION INTRAMUSCULAR; INTRAVENOUS
Status: DISPENSED
Start: 2018-10-01

## (undated) RX ORDER — CEFAZOLIN SODIUM 1 G/3ML
INJECTION, POWDER, FOR SOLUTION INTRAMUSCULAR; INTRAVENOUS
Status: DISPENSED
Start: 2018-10-01

## (undated) RX ORDER — GABAPENTIN 300 MG/1
CAPSULE ORAL
Status: DISPENSED
Start: 2018-10-01

## (undated) RX ORDER — HYDROMORPHONE HYDROCHLORIDE 1 MG/ML
INJECTION, SOLUTION INTRAMUSCULAR; INTRAVENOUS; SUBCUTANEOUS
Status: DISPENSED
Start: 2018-10-01

## (undated) RX ORDER — ONDANSETRON 2 MG/ML
INJECTION INTRAMUSCULAR; INTRAVENOUS
Status: DISPENSED
Start: 2018-10-01

## (undated) RX ORDER — DEXAMETHASONE SODIUM PHOSPHATE 4 MG/ML
INJECTION, SOLUTION INTRA-ARTICULAR; INTRALESIONAL; INTRAMUSCULAR; INTRAVENOUS; SOFT TISSUE
Status: DISPENSED
Start: 2018-10-01

## (undated) RX ORDER — CEFAZOLIN SODIUM 2 G/100ML
INJECTION, SOLUTION INTRAVENOUS
Status: DISPENSED
Start: 2018-10-01

## (undated) RX ORDER — PROPOFOL 10 MG/ML
INJECTION, EMULSION INTRAVENOUS
Status: DISPENSED
Start: 2018-10-01

## (undated) RX ORDER — ACETAMINOPHEN 325 MG/1
TABLET ORAL
Status: DISPENSED
Start: 2018-10-01

## (undated) RX ORDER — APREPITANT 40 MG/1
CAPSULE ORAL
Status: DISPENSED
Start: 2018-10-01